# Patient Record
Sex: FEMALE | Race: WHITE | NOT HISPANIC OR LATINO | URBAN - METROPOLITAN AREA
[De-identification: names, ages, dates, MRNs, and addresses within clinical notes are randomized per-mention and may not be internally consistent; named-entity substitution may affect disease eponyms.]

---

## 2023-06-14 ENCOUNTER — OFFICE VISIT (OUTPATIENT)
Dept: PRIMARY CARE | Facility: CLINIC | Age: 23
End: 2023-06-14
Payer: COMMERCIAL

## 2023-06-14 VITALS
RESPIRATION RATE: 17 BRPM | HEART RATE: 83 BPM | BODY MASS INDEX: 36.47 KG/M2 | WEIGHT: 213.6 LBS | HEIGHT: 64 IN | DIASTOLIC BLOOD PRESSURE: 62 MMHG | SYSTOLIC BLOOD PRESSURE: 94 MMHG | OXYGEN SATURATION: 97 % | TEMPERATURE: 97.5 F

## 2023-06-14 DIAGNOSIS — Z13.1 SCREENING FOR DIABETES MELLITUS: ICD-10-CM

## 2023-06-14 DIAGNOSIS — F90.2 ATTENTION DEFICIT HYPERACTIVITY DISORDER (ADHD), COMBINED TYPE: Primary | ICD-10-CM

## 2023-06-14 DIAGNOSIS — Z13.220 SCREENING, LIPID: ICD-10-CM

## 2023-06-14 DIAGNOSIS — F41.9 ANXIETY: ICD-10-CM

## 2023-06-14 DIAGNOSIS — E66.09 CLASS 2 OBESITY DUE TO EXCESS CALORIES WITHOUT SERIOUS COMORBIDITY WITH BODY MASS INDEX (BMI) OF 36.0 TO 36.9 IN ADULT: ICD-10-CM

## 2023-06-14 PROBLEM — M79.602 LEFT ARM PAIN: Status: RESOLVED | Noted: 2020-02-25 | Resolved: 2023-06-14

## 2023-06-14 PROBLEM — F33.1 DEPRESSION, MAJOR, RECURRENT, MODERATE (MULTI): Status: RESOLVED | Noted: 2019-03-11 | Resolved: 2023-06-14

## 2023-06-14 PROBLEM — F90.9 ADHD (ATTENTION DEFICIT HYPERACTIVITY DISORDER): Status: ACTIVE | Noted: 2023-06-14

## 2023-06-14 PROBLEM — F33.1 DEPRESSION, MAJOR, RECURRENT, MODERATE (MULTI): Status: ACTIVE | Noted: 2019-03-11

## 2023-06-14 PROCEDURE — 3008F BODY MASS INDEX DOCD: CPT | Performed by: FAMILY MEDICINE

## 2023-06-14 PROCEDURE — 1036F TOBACCO NON-USER: CPT | Performed by: FAMILY MEDICINE

## 2023-06-14 PROCEDURE — 99203 OFFICE O/P NEW LOW 30 MIN: CPT | Performed by: FAMILY MEDICINE

## 2023-06-14 ASSESSMENT — ENCOUNTER SYMPTOMS
LOSS OF SENSATION IN FEET: 0
SHORTNESS OF BREATH: 0
AGITATION: 1
OCCASIONAL FEELINGS OF UNSTEADINESS: 0
CONSTIPATION: 0
DEPRESSION: 0
POLYDIPSIA: 0
TROUBLE SWALLOWING: 0
POLYPHAGIA: 0
WHEEZING: 0
DECREASED CONCENTRATION: 1
APPETITE CHANGE: 0
ACTIVITY CHANGE: 0
HYPERACTIVE: 1
FATIGUE: 0
DIARRHEA: 0
DYSPHORIC MOOD: 1
SLEEP DISTURBANCE: 1
HALLUCINATIONS: 0
NERVOUS/ANXIOUS: 1
COUGH: 0
PALPITATIONS: 1

## 2023-06-14 ASSESSMENT — PATIENT HEALTH QUESTIONNAIRE - PHQ9
1. LITTLE INTEREST OR PLEASURE IN DOING THINGS: NOT AT ALL
SUM OF ALL RESPONSES TO PHQ9 QUESTIONS 1 AND 2: 0
2. FEELING DOWN, DEPRESSED OR HOPELESS: NOT AT ALL

## 2023-06-14 ASSESSMENT — COLUMBIA-SUICIDE SEVERITY RATING SCALE - C-SSRS
6. HAVE YOU EVER DONE ANYTHING, STARTED TO DO ANYTHING, OR PREPARED TO DO ANYTHING TO END YOUR LIFE?: NO
2. HAVE YOU ACTUALLY HAD ANY THOUGHTS OF KILLING YOURSELF?: NO
1. IN THE PAST MONTH, HAVE YOU WISHED YOU WERE DEAD OR WISHED YOU COULD GO TO SLEEP AND NOT WAKE UP?: NO

## 2023-06-14 NOTE — PROGRESS NOTES
Subjective   Patient ID: Pretty Vale is a 23 y.o. female who presents for Annual Exam (Physical, discuss medication).    New patient here to discuss medication. Is wanting to go on medication for ADHD. Has seen Dr. Bartlett psychologist since February and he recommended she start ADD medication. Has had issues with anxiety and dysphoria. She was tried on antidepressants. She is concerned that her ADD is causing all the other symptoms.     Began having issues with mood age 13. Did not effect daily life until went to college. In elementary school, she had issues with organization, turning in assignments and keeping up. She was never tested in school due to Mom's resistance. Grades were C and D.     Dr. Bartlett sent along evaluation dated June 12, 2023. He recommended stimulant therapy for her.     Has medical marijuana card from [place].   Never been on stimulants or any other ADD medications. She is feeling that her function at home is impaired. She has issues keeping up with laundry, job hopping. Emotional issues affecting relationships. At work, has hard time once novelty wears off. She melts down in the mornings. She has been at current job for 2 months, prior job 3 weeks, prior 6 months. Easily distracted driving, got speeding ticket due to that.     Activities - goes to baseball games, hiking, swimming, video games.     Only eats one meal a day and no desire to eat more.     Weight has been stable for the past year. She gained lot of weight freeshman year college. She had stabilized below 200 until this past year.          No current outpatient medications on file.    Patient Active Problem List   Diagnosis    Anxiety    ADHD (attention deficit hyperactivity disorder)         Review of Systems   Constitutional:  Negative for activity change, appetite change and fatigue.   HENT:  Negative for trouble swallowing.    Respiratory:  Negative for cough, shortness of breath and wheezing.    Cardiovascular:   "Positive for palpitations.        Gets feeling like heart skips a beat and then is fine for long time.    Gastrointestinal:  Negative for constipation and diarrhea.   Endocrine: Positive for cold intolerance. Negative for heat intolerance, polydipsia, polyphagia and polyuria.   Genitourinary:         Periods regular, no contraception. She is not trying to actively get pregnant.    Skin:  Negative for rash.        No hair loss or dry skin.    Psychiatric/Behavioral:  Positive for agitation, decreased concentration, dysphoric mood and sleep disturbance. Negative for hallucinations and suicidal ideas. The patient is nervous/anxious and is hyperactive.        Objective   BP 94/62 (BP Location: Right arm, Patient Position: Sitting, BP Cuff Size: Large adult)   Pulse 83   Temp 36.4 °C (97.5 °F)   Resp 17   Ht 1.626 m (5' 4\")   Wt 96.9 kg (213 lb 9.6 oz)   SpO2 97%   BMI 36.66 kg/m²     Physical Exam  Vitals reviewed.   Constitutional:       General: She is not in acute distress.     Appearance: She is not ill-appearing.      Comments: obese   Neck:      Vascular: No carotid bruit.      Comments: No thyromegaly.   Cardiovascular:      Rate and Rhythm: Normal rate and regular rhythm.      Pulses: Normal pulses.      Heart sounds: No murmur heard.  Pulmonary:      Effort: Pulmonary effort is normal.      Breath sounds: Normal breath sounds.   Musculoskeletal:      Left lower leg: No edema.   Skin:     Findings: No rash.   Neurological:      Mental Status: She is alert and oriented to person, place, and time.   Psychiatric:         Behavior: Behavior normal.         Thought Content: Thought content normal.      Comments: Mildly anxious, Good eye contact.   Has no flight of ideas.          Assessment/Plan   Problem List Items Addressed This Visit          Other    Anxiety    Relevant Orders    Referral to Psychiatry    ADHD (attention deficit hyperactivity disorder) - Primary     Evaluation by Psychologist at [facility].   Consistent with ADHD. Also with some mood issues.   She is on medical marijuana. Explained not comfortable starting stimulants on her and need to refer to psychiatry.          Relevant Orders    Referral to Psychiatry     Other Visit Diagnoses       Screening for diabetes mellitus        Relevant Orders    Basic Metabolic Panel    Hemoglobin A1C    Follow Up In Primary Care    Screening, lipid        Relevant Orders    Lipid Panel    Follow Up In Primary Care    Class 2 obesity due to excess calories without serious comorbidity with body mass index (BMI) of 36.0 to 36.9 in adult        Relevant Orders    Thyroid Stimulating Hormone    Thyroxine, Free              Assessment, plans, tests, and follow up discussed with patient and patient verbalized understanding. Pretty was given an opportunity to ask questions and  any concerns were addressed including but not limited to referral, labs, follow up for physical. .

## 2023-06-14 NOTE — ASSESSMENT & PLAN NOTE
Evaluation by Psychologist at [facility].   Consistent with ADHD. Also with some mood issues.   She is on medical marijuana. Explained not comfortable starting stimulants on her and need to refer to psychiatry.